# Patient Record
Sex: MALE | Race: WHITE | Employment: FULL TIME | URBAN - METROPOLITAN AREA
[De-identification: names, ages, dates, MRNs, and addresses within clinical notes are randomized per-mention and may not be internally consistent; named-entity substitution may affect disease eponyms.]

---

## 2017-03-20 ENCOUNTER — TELEPHONE (OUTPATIENT)
Dept: FAMILY MEDICINE CLINIC | Facility: CLINIC | Age: 57
End: 2017-03-20

## 2017-03-20 NOTE — TELEPHONE ENCOUNTER
Diabetic Medication Protocol Failed3/17 9:31 PM   HgBA1C procedure resulted in past 6 months    Last HgBA1C < 7.5    Appointment in past 6 or next 3 months     Requesting Oseni  LOV: 9/2016  RTC: 6m   Last Labs: 9/2016  Filled: 3/18/16 #90 with 3 refills

## 2017-03-20 NOTE — TELEPHONE ENCOUNTER
Hypertension Medications Protocol Failed3/19 3:09 PM   Appointment in past 6 or next 3 months        Cholesterol Medication Protocol Failed3/19 3:09 PM   Lipid panel within past 6 months        Requesting Quinapril and Atorvastatin  LOV: 9/17/16  RTC: luan

## 2017-03-20 NOTE — TELEPHONE ENCOUNTER
1st Outreach to schedule ov for med refill. Phone just rings unable to leave message. Will try again later.

## 2017-03-23 RX ORDER — ATORVASTATIN CALCIUM 80 MG/1
TABLET, FILM COATED ORAL
Qty: 90 TABLET | Refills: 1 | Status: SHIPPED | OUTPATIENT
Start: 2017-03-23 | End: 2017-10-06

## 2017-03-23 RX ORDER — QUINAPRIL 20 MG/1
TABLET ORAL
Qty: 90 TABLET | Refills: 1 | Status: SHIPPED | OUTPATIENT
Start: 2017-03-23 | End: 2017-09-09

## 2017-03-23 NOTE — TELEPHONE ENCOUNTER
Pt wife stated that pt works out of town and she's going to see him this weekend. Pt will be back 5/20/2017. Wife would like to have medication refill so she can take meds to him.  Please advise  Future Appointments  Date Time Provider Barb Griffiths

## 2017-03-23 NOTE — TELEPHONE ENCOUNTER
Pt wife called and stated that pt is working out of town and won't be back till 5/20/17. Wife would like to get refill because she's going to see her  this weekend and would like to take his medication.  Please advise thank you  Future Appointments

## 2017-03-24 RX ORDER — ALOGLIPTIN AND PIOGLITAZONE 25; 45 MG/1; MG/1
TABLET, FILM COATED ORAL
Qty: 90 TABLET | Refills: 0 | Status: SHIPPED | OUTPATIENT
Start: 2017-03-24 | End: 2017-06-11

## 2017-04-10 RX ORDER — LEVOTHYROXINE SODIUM 175 UG/1
TABLET ORAL
Qty: 90 TABLET | Refills: 1 | Status: SHIPPED | OUTPATIENT
Start: 2017-04-10 | End: 2017-10-06

## 2017-04-10 NOTE — TELEPHONE ENCOUNTER
Requesting Levothyroxine and Farxiga  LOV: 9/17/16  RTC: 6 months  Last Labs: 9/3/16 A1c, micro and tsh  Filled: 3/18/16 #90 with 3 refills  Levothyroxine and Digna Guerrero    Future Appointments  Date Time Provider Barb Hernandez   5/20/2017 8:00 AM Odalys Arevalo

## 2017-04-16 RX ORDER — DAPAGLIFLOZIN 10 MG/1
TABLET, FILM COATED ORAL
Qty: 90 TABLET | Refills: 0 | Status: SHIPPED | OUTPATIENT
Start: 2017-04-16 | End: 2017-07-07

## 2017-05-14 RX ORDER — GLIMEPIRIDE 4 MG/1
TABLET ORAL
Qty: 180 TABLET | Refills: 0 | Status: CANCELLED | OUTPATIENT
Start: 2017-05-14

## 2017-05-16 NOTE — TELEPHONE ENCOUNTER
Diabetic Medication Protocol Failed5/14 12:40 PM   HgBA1C procedure resulted in past 6 months    Last HgBA1C < 7.5     Requesting Glimepiride   LOV: 9/17/16  RTC: 6 months   Last Labs: 9/2016  Filled: 3/18/16 #90 days  with 3 refills    Future Appointmen

## 2017-05-20 ENCOUNTER — OFFICE VISIT (OUTPATIENT)
Dept: FAMILY MEDICINE CLINIC | Facility: CLINIC | Age: 57
End: 2017-05-20

## 2017-05-20 ENCOUNTER — LAB ENCOUNTER (OUTPATIENT)
Dept: LAB | Age: 57
End: 2017-05-20
Attending: INTERNAL MEDICINE
Payer: COMMERCIAL

## 2017-05-20 VITALS
WEIGHT: 224 LBS | HEART RATE: 74 BPM | HEIGHT: 72 IN | RESPIRATION RATE: 16 BRPM | TEMPERATURE: 98 F | DIASTOLIC BLOOD PRESSURE: 74 MMHG | SYSTOLIC BLOOD PRESSURE: 130 MMHG | BODY MASS INDEX: 30.34 KG/M2

## 2017-05-20 DIAGNOSIS — E11.9 TYPE 2 DIABETES MELLITUS WITHOUT COMPLICATION, WITHOUT LONG-TERM CURRENT USE OF INSULIN (HCC): ICD-10-CM

## 2017-05-20 DIAGNOSIS — E78.00 PURE HYPERCHOLESTEROLEMIA: ICD-10-CM

## 2017-05-20 DIAGNOSIS — E03.9 HYPOTHYROIDISM, UNSPECIFIED TYPE: Primary | ICD-10-CM

## 2017-05-20 DIAGNOSIS — I10 ESSENTIAL HYPERTENSION: ICD-10-CM

## 2017-05-20 DIAGNOSIS — E03.9 HYPOTHYROIDISM, UNSPECIFIED TYPE: ICD-10-CM

## 2017-05-20 PROCEDURE — 82570 ASSAY OF URINE CREATININE: CPT

## 2017-05-20 PROCEDURE — 80061 LIPID PANEL: CPT

## 2017-05-20 PROCEDURE — 36415 COLL VENOUS BLD VENIPUNCTURE: CPT

## 2017-05-20 PROCEDURE — 83036 HEMOGLOBIN GLYCOSYLATED A1C: CPT

## 2017-05-20 PROCEDURE — 99214 OFFICE O/P EST MOD 30 MIN: CPT | Performed by: PHYSICIAN ASSISTANT

## 2017-05-20 PROCEDURE — 80053 COMPREHEN METABOLIC PANEL: CPT

## 2017-05-20 PROCEDURE — 84443 ASSAY THYROID STIM HORMONE: CPT

## 2017-05-20 PROCEDURE — 82043 UR ALBUMIN QUANTITATIVE: CPT

## 2017-05-20 RX ORDER — GLIMEPIRIDE 4 MG/1
4 TABLET ORAL 2 TIMES DAILY
Qty: 180 TABLET | Refills: 3 | Status: SHIPPED | OUTPATIENT
Start: 2017-05-20

## 2017-05-20 NOTE — PATIENT INSTRUCTIONS
Thank you for choosing Mak Lopes PA-C at [place] [address]  To Do: Taryn Campbell  1. Pt to continue medications  2. Pt to get lab work   3.  Follow-up in 6 months, sooner if problems    • Please signup for MY CHART, which is electronic access t and to improve your quality of life.     Referrals, and Radiology Information:    If your insurance requires a referral to a specialist, please allow 5 business days to process your referral request.    If Charo Teague PA-C orders a CT or MRI, it may t

## 2017-05-20 NOTE — PROGRESS NOTES
[facility] Internal Medicine Progress Note    CC:  Patient presents with:  Medication Request      HPI:   HPI  DM  Sugars at home have been running 160s  He exercises everyday  Doing well on Farxiga, oseni, metformin, and glimepiride    Hypothyro well-nourished, and in no distress. HENT:   Right Ear: External ear normal.   Left Ear: External ear normal.   Nose: Nose normal.   Mouth/Throat: Oropharynx is clear and moist. No oropharyngeal exudate.    Eyes: EOM are normal. Pupils are equal, round, an Sig: Take 1 tablet (4 mg total) by mouth 2 (two) times daily.       MetFORMIN HCl 1000 MG Oral Tab 225 tablet 3      Sig: TAKE ONE TABLET BY MOUTH IN THE MORNING AND ONE & ONE-HALF TABLETS IN THE EVENING           Imaging & Consults:  None     Patient/Car

## 2017-06-13 RX ORDER — ALOGLIPTIN AND PIOGLITAZONE 25; 45 MG/1; MG/1
TABLET, FILM COATED ORAL
Qty: 90 TABLET | Refills: 1 | Status: SHIPPED | OUTPATIENT
Start: 2017-06-13 | End: 2017-12-18

## 2017-06-13 NOTE — TELEPHONE ENCOUNTER
Requesting Oseni 25/45mg daily  LOV: 5/20/17  RTC: 6 months  Last Labs: 5/20/17  Filled: 3/24/17 #90 with 0 refills    No future appointments. Passed protocol - med approved.

## 2017-07-10 RX ORDER — DAPAGLIFLOZIN 10 MG/1
TABLET, FILM COATED ORAL
Qty: 90 TABLET | Refills: 1 | Status: SHIPPED | OUTPATIENT
Start: 2017-07-10 | End: 2017-12-18

## 2017-07-10 NOTE — TELEPHONE ENCOUNTER
Requesting Farxiga 10mg daily  LOV: 5/20/17  RTC: 6 months  Last Labs: 5/20/17  Filled: 4/16/17 #90 with 0 refills    Future Appointments  Date Time Provider Barb Hernandez   12/18/2017 9:20 AM Wilfred Sena MD [facility] [address] [facility] [address]       Passed protocol

## 2017-09-11 RX ORDER — QUINAPRIL 20 MG/1
TABLET ORAL
Qty: 90 TABLET | Refills: 1 | Status: SHIPPED | OUTPATIENT
Start: 2017-09-11 | End: 2018-03-12

## 2017-09-11 NOTE — TELEPHONE ENCOUNTER
Requesting Quinapril 20mg daily  LOV: 5/20/17  RTC: 6 months  Last Labs: 5/20/17  Filled: 3/27/17 #90 with 1 refills    Future Appointments  Date Time Provider Barb Hernandez   12/18/2017 9:20 AM Uvaldo Bland MD [facility] [address] [facility] [address]       Passed protoco

## 2017-10-09 RX ORDER — ATORVASTATIN CALCIUM 80 MG/1
TABLET, FILM COATED ORAL
Qty: 90 TABLET | Refills: 1 | Status: SHIPPED | OUTPATIENT
Start: 2017-10-09 | End: 2018-03-27

## 2017-10-09 RX ORDER — LEVOTHYROXINE SODIUM 175 UG/1
TABLET ORAL
Qty: 90 TABLET | Refills: 1 | Status: SHIPPED | OUTPATIENT
Start: 2017-10-09 | End: 2018-03-27

## 2017-10-09 NOTE — TELEPHONE ENCOUNTER
Requesting Atorvastatin and levothyoxine  LOV: 5/20/17  RTC: 6 months  Last Labs: 5/20/17  Filled: 3/23/17 #90 with 1 refills atorvastatin  Filled: 4/10/17 390 with 1 refills levothyroxine  Passes protocol - refilled    Future Appointments  Date Time Provi

## 2017-12-18 ENCOUNTER — LAB ENCOUNTER (OUTPATIENT)
Dept: LAB | Age: 57
End: 2017-12-18
Attending: FAMILY MEDICINE
Payer: COMMERCIAL

## 2017-12-18 ENCOUNTER — OFFICE VISIT (OUTPATIENT)
Dept: FAMILY MEDICINE CLINIC | Facility: CLINIC | Age: 57
End: 2017-12-18

## 2017-12-18 VITALS
SYSTOLIC BLOOD PRESSURE: 132 MMHG | BODY MASS INDEX: 31.29 KG/M2 | WEIGHT: 231 LBS | DIASTOLIC BLOOD PRESSURE: 72 MMHG | TEMPERATURE: 98 F | HEART RATE: 80 BPM | HEIGHT: 72 IN | RESPIRATION RATE: 16 BRPM

## 2017-12-18 DIAGNOSIS — E03.9 HYPOTHYROIDISM, UNSPECIFIED TYPE: ICD-10-CM

## 2017-12-18 DIAGNOSIS — E78.00 PURE HYPERCHOLESTEROLEMIA: ICD-10-CM

## 2017-12-18 DIAGNOSIS — I10 ESSENTIAL HYPERTENSION: ICD-10-CM

## 2017-12-18 DIAGNOSIS — E11.9 TYPE 2 DIABETES MELLITUS WITHOUT COMPLICATION, WITHOUT LONG-TERM CURRENT USE OF INSULIN (HCC): ICD-10-CM

## 2017-12-18 DIAGNOSIS — E11.9 TYPE 2 DIABETES MELLITUS WITHOUT COMPLICATION, WITHOUT LONG-TERM CURRENT USE OF INSULIN (HCC): Primary | ICD-10-CM

## 2017-12-18 PROCEDURE — 36415 COLL VENOUS BLD VENIPUNCTURE: CPT | Performed by: FAMILY MEDICINE

## 2017-12-18 PROCEDURE — 80061 LIPID PANEL: CPT | Performed by: FAMILY MEDICINE

## 2017-12-18 PROCEDURE — 81003 URINALYSIS AUTO W/O SCOPE: CPT | Performed by: FAMILY MEDICINE

## 2017-12-18 PROCEDURE — 83036 HEMOGLOBIN GLYCOSYLATED A1C: CPT | Performed by: FAMILY MEDICINE

## 2017-12-18 PROCEDURE — 99214 OFFICE O/P EST MOD 30 MIN: CPT | Performed by: FAMILY MEDICINE

## 2017-12-18 PROCEDURE — 80050 GENERAL HEALTH PANEL: CPT | Performed by: FAMILY MEDICINE

## 2017-12-18 RX ORDER — ASPIRIN 81 MG/1
81 TABLET ORAL DAILY
COMMUNITY

## 2017-12-18 RX ORDER — ALOGLIPTIN AND PIOGLITAZONE 25; 45 MG/1; MG/1
1 TABLET, FILM COATED ORAL DAILY
Qty: 90 TABLET | Refills: 1 | Status: SHIPPED | OUTPATIENT
Start: 2017-12-18

## 2017-12-18 NOTE — PROGRESS NOTES
HPI:    Patient ID: Alex Le is a 61year old male. HPI  Mr. Dione Oliva is a pleasant 62 y/o M with history of diabetes mellitus, hypertension, hyperlipidemia, hypothyroidism who is here to establish care with me.   He has been compliant with his meds Exam   Constitutional: No distress. HENT:   Mouth/Throat: Oropharynx is clear and moist. No oropharyngeal exudate. Eyes: Conjunctivae are normal. No scleral icterus. Neck: Neck supple. No thyromegaly present.    Cardiovascular: Normal rate, regular rh

## 2017-12-18 NOTE — PATIENT INSTRUCTIONS
Thank you for choosing Berny Gomez MD at [place] [address]  To Do: Mo Campbell  1. Please take meds as directed; please take ASA 81 mg daily  Effective 6/19/17 until November 2017  Due to [employer] is being moved.   It is inside the medication interactions.  It is your duty and for your safety to discuss with the pharmacist and our office with questions, and to notify us and stop treatment if problems arise, but know that our intention is that the benefits outweigh those potential ris

## 2018-02-12 RX ORDER — ALOGLIPTIN BENZOATE AND PIOGLITAZONE HYDROCHLORIDE 25; 45 MG/1; MG/1
1 TABLET, FILM COATED ORAL DAILY
Qty: 90 TABLET | Refills: 1 | OUTPATIENT
Start: 2018-02-12

## 2018-02-12 NOTE — TELEPHONE ENCOUNTER
Pt wants to know if he can get a refill on , but would like the generic brand . Cheryl Cervantes Please advise    OSENI 25-45 MG Oral Tab 90 tablet 1 12/18/2017    Sig :  Take 1 tablet by mouth daily.      Route: Marge Hurst     Order #:   [id number]

## 2018-02-12 NOTE — TELEPHONE ENCOUNTER
Diabetic Medication Protocol Failed2/12 11:35 AM   Last HgBA1C < 7.5    HgBA1C procedure resulted in past 6 months    Microalbumin procedure in past 12 months or taking ACE/ARB    Appointment in past 6 or next 3 months     Requesting oseni 25-45mg  LOV: 12

## 2018-03-13 RX ORDER — QUINAPRIL 20 MG/1
TABLET ORAL
Qty: 90 TABLET | Refills: 1 | Status: SHIPPED | OUTPATIENT
Start: 2018-03-13

## 2018-03-13 NOTE — TELEPHONE ENCOUNTER
Hypertension Medications Protocol Passed3/12 5:31 AM   CMP or BMP in past 12 months    Last serum creatinine< 2.0    Appointment in past 6 or next 3 months     Requesting quinapril 20mg   LOV: 12/18/17  RTC: 6 months  Last Relevant Labs: 12/18/17  Filled:

## 2018-03-28 RX ORDER — LEVOTHYROXINE SODIUM 175 UG/1
TABLET ORAL
Qty: 90 TABLET | Refills: 1 | Status: SHIPPED | OUTPATIENT
Start: 2018-03-28

## 2018-03-28 RX ORDER — ATORVASTATIN CALCIUM 80 MG/1
TABLET, FILM COATED ORAL
Qty: 90 TABLET | Refills: 1 | Status: SHIPPED | OUTPATIENT
Start: 2018-03-28

## 2018-03-28 NOTE — TELEPHONE ENCOUNTER
Per protocol medication passed and was sent to the pharmacy.     Future Appointments  Date Time Provider Barb Hernandez   7/7/2018 7:00 AM Gypsy Lombard, MD [facility] [address] [facility] [address]

## 2018-06-04 ENCOUNTER — TELEPHONE (OUTPATIENT)
Dept: FAMILY MEDICINE CLINIC | Facility: CLINIC | Age: 58
End: 2018-06-04

## 2018-06-04 NOTE — TELEPHONE ENCOUNTER
PA for oseni 25-45mg has been submitted through [facility] and approved CaseId:[id number];Status:Approved; Review Type:Prior Auth; Coverage Start Date:05/05/2018; Coverage End Date:06/04/2019

## 2018-06-05 ENCOUNTER — TELEPHONE (OUTPATIENT)
Dept: FAMILY MEDICINE CLINIC | Facility: CLINIC | Age: 58
End: 2018-06-05

## 2018-06-05 NOTE — TELEPHONE ENCOUNTER
Requesting MetFORMIN HCl 1000 MG (pt requesting temp 30 day supply until he EC with new PCP in [place].  LOV: 12/18/17  RTC: 6 mos  Last Labs: 12/18/17  Filled: 5/20/17 #225 with 3 refills    No future appointments.     Temp 30 day supply approved with note that

## 2018-06-06 ENCOUNTER — PATIENT OUTREACH (OUTPATIENT)
Dept: FAMILY MEDICINE CLINIC | Facility: CLINIC | Age: 58
End: 2018-06-06

## 2018-06-25 ENCOUNTER — TELEPHONE (OUTPATIENT)
Dept: FAMILY MEDICINE CLINIC | Facility: CLINIC | Age: 58
End: 2018-06-25

## 2018-06-25 NOTE — TELEPHONE ENCOUNTER
Received signed release form. Prepared and faxed to [facility] for processing.  Records are asking to be sent to [address] [place] [address], [address] [facility], [address] Phone [phone] Fax [phone]

## 2023-07-13 NOTE — TELEPHONE ENCOUNTER
Unable to leave a message to notify patient that he is due for an office visit in order to have his prescriptions filled.  03/20/2017 None
